# Patient Record
Sex: MALE | Race: WHITE | NOT HISPANIC OR LATINO
[De-identification: names, ages, dates, MRNs, and addresses within clinical notes are randomized per-mention and may not be internally consistent; named-entity substitution may affect disease eponyms.]

---

## 2020-12-25 ENCOUNTER — NON-APPOINTMENT (OUTPATIENT)
Age: 35
End: 2020-12-25

## 2020-12-25 DIAGNOSIS — M50.90 CERVICAL DISC DISORDER, UNSPECIFIED, UNSPECIFIED CERVICAL REGION: ICD-10-CM

## 2020-12-25 DIAGNOSIS — Z87.09 PERSONAL HISTORY OF OTHER DISEASES OF THE RESPIRATORY SYSTEM: ICD-10-CM

## 2020-12-25 DIAGNOSIS — Z86.59 PERSONAL HISTORY OF OTHER MENTAL AND BEHAVIORAL DISORDERS: ICD-10-CM

## 2020-12-25 DIAGNOSIS — Z83.3 FAMILY HISTORY OF DIABETES MELLITUS: ICD-10-CM

## 2020-12-25 DIAGNOSIS — Z78.9 OTHER SPECIFIED HEALTH STATUS: ICD-10-CM

## 2020-12-29 ENCOUNTER — APPOINTMENT (OUTPATIENT)
Dept: GASTROENTEROLOGY | Facility: CLINIC | Age: 35
End: 2020-12-29
Payer: COMMERCIAL

## 2020-12-29 VITALS
HEART RATE: 80 BPM | WEIGHT: 172 LBS | DIASTOLIC BLOOD PRESSURE: 80 MMHG | HEIGHT: 69 IN | BODY MASS INDEX: 25.48 KG/M2 | SYSTOLIC BLOOD PRESSURE: 122 MMHG | TEMPERATURE: 98.7 F

## 2020-12-29 DIAGNOSIS — K64.4 RESIDUAL HEMORRHOIDAL SKIN TAGS: ICD-10-CM

## 2020-12-29 PROCEDURE — 99214 OFFICE O/P EST MOD 30 MIN: CPT

## 2020-12-29 PROCEDURE — 99072 ADDL SUPL MATRL&STAF TM PHE: CPT

## 2020-12-29 NOTE — ASSESSMENT
[FreeTextEntry1] : \par \par \par \par 1. Hemorrhoids:  better, s/p recent flare of externnal w  pain,  swelling,  & bleeding\par most likely 2/2 to preceeding acute diarrheal illness\par * Discussed the potential complications of thrombosis,  pain,  infection,  swelling, itching,  bleeding \par * High – Fiber Diet was reviewed and emphasized\par * 6  --  8 cups of decaffeinated fluid daily\par * Sitz Bathes BID,  No:  Anusol HC  Suppos / Cream  RI BID\par * No:  Tucks BID,   No:  Balneol Lotion,   No:  Calmoseptine Oint,   ++ Prep H prn\par *  Colorectal surgical evaluation for possible ablation w Dr Mishra \par \par \par \par 2. Irregular Bowel habits: better\par    Recent diarrhea followed bu constipation/ bloat\par    H/O Lactose intolerance, Anxiety / Depression\par    +FH metastatic colon Ca at age 61\par    Probably infectious cause\par    R/O IBD\par           Colonic Neoplasia\par           Functional Bowel D/O vs Essential Constipation\par \par P: check cbc, esr, crp, cmet, tsh, B12, FA, iron/celiac/IBD panels\par * Diet: moderate Fiber,  Low Fat & Lactose free, Low FODMAPs\par   currently 6-12 gram/qd\par * Increase fluid intake\par * Regular exercise\par * Probiotics  1  daily\par for coolonoscopy w + FH Colon Ca \par \par                    \par \par                                                                                                                                                                      \par 3. Intermittent dysphagia\par     see GERD\par \par \par \par \par 4. GERD:  active w  ht burn,  dysphagia,  glubus  w/o throat clear\par    R/O Erosive dis, Ring/Stricture, dysmotility\par \par *  h/o  LPR,  R/O  West’s w No Dysplasia  &  Esophagitis grade: A\par * Anti-reflux diet & life-style changes emphasized.  No  Bedge\par * Weight reduction & regular exercise emphasized\par * No  PPI: Omep 40mg q am   ,  ++  H2B q HS: Pepcid AC  \par No Carafate  1 gram:\par * ++   F/U  EGD:  [   1   ] mo.  /  [ 13757u     ] yr\par * No  pH Monitor,  No  Manometry,  f\par for  Esophagram--crude measure of motility\par * +++  ENT  eval/F/U,  No  Surgical  eval\par \par \par \par Informed Consent:\par * The risks & Benefits of  EGD / Colonoscopy were discussed w patient.\par * This included but was not limited to perforation, bleeding, sedation /med rxns possibly requiring surgery, blood transfusions, antibiotics & CPR/Intubation.\par * Pt. understands & agrees to the procedures.\par * Pt. advised to D/C  ASA/NSAIDs  7  Days \par * [ +++ ]  Dulcolax / Miralax / Mag. Citrate ,  [     ] Prepopik/ Clenpiq ,  [     ] Osmo Prep,  [    ] GoLytely,  prep. reviewed w Pt.\par * Hold  [           ] AM of procedure.\par * Hold  [           ] PM of procedure.\par * Take  [           ] PM of procedure.\par * Take  [           ] AM of procedure.\par \par

## 2020-12-29 NOTE — REVIEW OF SYSTEMS
[Dysuria] : no dysuria [Skin Lesions] : no skin lesions [Confused] : no confusion [Suicidal] : not suicidal [Proptosis] : no proptosis [Easy Bruising] : no tendency for easy bruising

## 2020-12-29 NOTE — HISTORY OF PRESENT ILLNESS
[de-identified] : \par \par \par This HPI  reflects a summary and review of records found in [facility], [facility] and/or [employer]\par \par PCP: Symone \par \par 36 yo M w h/o Depression/Anxiety, Cervical disc dis, sinus dis\par GERD w LPR\par Hemorrhoids, lactose intolerance: LBMs & bloat\par H/O typical, reg ht burn usu if cheats. eats late, also w intermittent dysphagia to solids\par also w globus, hoarseness\par has seen several ENTs over the yrs and told of LPR--usu reponds to diet, life-style modification and acid suppression, has normalized if consistent but then returns if non-compliant\par Recently completed Phd: so Incr stress and diet was off\par BMs: recently BMs :# 2-3, some strain/bloat\par intermittent BRBPR--paper                                                                                                                                                                                                                                                                                   Today:~ 2 weeks ago  in the ED w rectal pain x 1 D, told of " inflammed external hemorrhoids--rx w sitz bathes and Anusol HC cream\par prior had 4-5 days of N/V/D, mild cramping and bloat, no fever, chills or BRBPR\par has had intermittent bleeding in the past\par \par * Abd pain—no\par * Nausea—no\par * Vomit—no\par * Belching—no\par * Regurgitation—no\par * Ht burn—yes\par * Throat Clearing—no\par * Globus—yes\par * Cough—occas                       \par * Hoarseness—yes\par * Dysphagia—intermo \par * BMs: # 2-3 qd\par * Constipation—no\par * Diarrhea—no\par * Bloating—yes\par * Flatulence—yes\par * Gurgling—no\par * Melena—no\par * BPBPR—no\par * Anorexia—no\par * Wt. Loss-no\par \par                                                                                                                                                                                                                                                                                                                                                                                                                                                                                                                                                                                                                                                                                                                                                                                                                                                                                                              \par

## 2023-04-19 ENCOUNTER — APPOINTMENT (OUTPATIENT)
Dept: COLORECTAL SURGERY | Facility: CLINIC | Age: 38
End: 2023-04-19
Payer: COMMERCIAL

## 2023-04-19 ENCOUNTER — NON-APPOINTMENT (OUTPATIENT)
Age: 38
End: 2023-04-19

## 2023-04-19 VITALS
HEART RATE: 68 BPM | BODY MASS INDEX: 27.4 KG/M2 | SYSTOLIC BLOOD PRESSURE: 137 MMHG | HEIGHT: 69 IN | DIASTOLIC BLOOD PRESSURE: 87 MMHG | WEIGHT: 185 LBS

## 2023-04-19 PROCEDURE — 46600 DIAGNOSTIC ANOSCOPY SPX: CPT

## 2023-04-19 PROCEDURE — 99203 OFFICE O/P NEW LOW 30 MIN: CPT | Mod: 25

## 2023-04-19 NOTE — HISTORY OF PRESENT ILLNESS
[FreeTextEntry1] : 37 year old male pt referred by Dr. Suarez  for hemorrhoids\par \par Pt's father dies at 67 from colon cancer.\par \par Never had a colonoscopy \par \par Has been complaining about hemorrhoids for years \par A couple of years ago had to go to the ER at [facility] because the pain was so bad  and was unable to urinate due to swelling. Was given a medicine he can't remember to help him urinate. Was advised then to have surgery\par \par Hasn't had that problem with urinating since\par Feels lumps on the outside. Unable to push them back in \par Occasional  bleeding\par Feels pressure and discomfort after a BM, feels tired after a BM usually in the morning\par BM is the only thing that makes it worse. Pain is just when passing stool but then goes away within 30 minutes \par Tried witch hazel, Prep H, has done sitz baths with minimal relief\par Does not take stool softeners or fiber\par Does not get that much fiber in his diet\par Stools are soft\par Interested in having a surgical procedure as he has tried conservative measures with no relief.

## 2023-04-19 NOTE — ASSESSMENT
[FreeTextEntry1] : 37 M referred to us for symptomatic hemorrhoids. He is keen to undergo surgery.\par Plan:\par Discussed surgery: anoscopy, excision hemorrhoidectomy, excision of anal skin tags. Also discussed possible complications ans answered all his questions.\par High fiber diet.\par Plenty of oral fluids.\par To discuss with Dr Suarez - screening colonoscopy.\par

## 2023-04-19 NOTE — PHYSICAL EXAM
[Abdomen Masses] : No abdominal masses [Abdomen Tenderness] : ~T No ~M abdominal tenderness [No HSM] : no hepatosplenomegaly [No Rash or Lesion] : No rash or lesion [Alert] : alert [Oriented to Person] : oriented to person [Oriented to Place] : oriented to place [Oriented to Time] : oriented to time [Calm] : calm [de-identified] : soft [de-identified] : External - multiple anal skin tags; JOCELYN normal; anoscopy - grade 3 internal hemorrhoids [de-identified] : Normal [de-identified] : Normal [de-identified] : Normal [de-identified] : Normal [de-identified] : Normal

## 2023-04-26 ENCOUNTER — NON-APPOINTMENT (OUTPATIENT)
Age: 38
End: 2023-04-26

## 2023-05-01 ENCOUNTER — NON-APPOINTMENT (OUTPATIENT)
Age: 38
End: 2023-05-01

## 2023-05-02 ENCOUNTER — LABORATORY RESULT (OUTPATIENT)
Age: 38
End: 2023-05-02

## 2023-05-02 ENCOUNTER — NON-APPOINTMENT (OUTPATIENT)
Age: 38
End: 2023-05-02

## 2023-05-02 ENCOUNTER — APPOINTMENT (OUTPATIENT)
Dept: GASTROENTEROLOGY | Facility: CLINIC | Age: 38
End: 2023-05-02
Payer: COMMERCIAL

## 2023-05-02 VITALS
HEIGHT: 69 IN | DIASTOLIC BLOOD PRESSURE: 74 MMHG | SYSTOLIC BLOOD PRESSURE: 132 MMHG | WEIGHT: 182 LBS | BODY MASS INDEX: 26.96 KG/M2 | HEART RATE: 65 BPM

## 2023-05-02 DIAGNOSIS — Z00.00 ENCOUNTER FOR GENERAL ADULT MEDICAL EXAMINATION W/OUT ABNORMAL FINDINGS: ICD-10-CM

## 2023-05-02 DIAGNOSIS — K64.8 OTHER HEMORRHOIDS: ICD-10-CM

## 2023-05-02 DIAGNOSIS — Z86.39 PERSONAL HISTORY OF OTHER ENDOCRINE, NUTRITIONAL AND METABOLIC DISEASE: ICD-10-CM

## 2023-05-02 DIAGNOSIS — R13.19 OTHER DYSPHAGIA: ICD-10-CM

## 2023-05-02 DIAGNOSIS — Z86.69 PERSONAL HISTORY OF OTHER DISEASES OF THE NERVOUS SYSTEM AND SENSE ORGANS: ICD-10-CM

## 2023-05-02 DIAGNOSIS — K62.5 HEMORRHAGE OF ANUS AND RECTUM: ICD-10-CM

## 2023-05-02 DIAGNOSIS — R10.31 RIGHT LOWER QUADRANT PAIN: ICD-10-CM

## 2023-05-02 DIAGNOSIS — Z12.11 ENCOUNTER FOR SCREENING FOR MALIGNANT NEOPLASM OF COLON: ICD-10-CM

## 2023-05-02 DIAGNOSIS — Z87.09 PERSONAL HISTORY OF OTHER DISEASES OF THE RESPIRATORY SYSTEM: ICD-10-CM

## 2023-05-02 DIAGNOSIS — R19.8 OTHER SPECIFIED SYMPTOMS AND SIGNS INVOLVING THE DIGESTIVE SYSTEM AND ABDOMEN: ICD-10-CM

## 2023-05-02 PROCEDURE — 99215 OFFICE O/P EST HI 40 MIN: CPT

## 2023-05-02 NOTE — ASSESSMENT
[FreeTextEntry1] : \par \par 1. Recently ED Visit for RLQ pain\par described as cramping/burning \par Labs--> wnl\par Ct Abd/Pelv--> normal appendix / No active inflammation\par \par R/O Infectious ileitis\par        IBD\par        IBS-  H/O Lactose intolerance, Anxiety / Depression\par        Neoplasia--> +FH metastatic colon Ca at age 61\par \par \par P: check cbc, esr, crp, cmet, tsh, B12, FA, iron/celiac/IBD panels\par * Diet: low  Fiber,  Low Fat & Lactose free, Low FODMAPs\par   currently 6-12 gram/qd\par * Increase fluid intake\par * Regular exercise\par * Probiotics  1  daily\par Trial od Peppermint until CT report reviewd--pt to have report faxed \par for colonoscopy \par may need CTE/MRE \par \par \par \par \par 2.Hemorrhoids:  s/p  recent flare of external/ internal w  pain,  swelling,  & bleeding\par * Discussed previously the  potential complications of thrombosis,  pain,  infection,  swelling, itching,  bleeding --none recently\par Recommendations: \par *  low / mod Fiber Diet was reviewed and emphasized\par * 6  --  8 cups of decaffeinated fluid daily was emphasized \par * Sitz Bathes as needed ,  No:  Anusol HC  Suppos / Cream  MI BID -- was needed\par * No:  Tucks BID,  Balneol Lotion,   Calmoseptine Oint -- was needed ;    can use  Prep H prn\par *  had   Colorectal surgical evaluation w Dr. Lynn for possible ablation \par \par \par \par                  \par \par \par                                                                                                                                                                      \par 3. GERD:  presently w no   ht burn,  dysphagia,  globus  w/o throat clear\par    \par *  + h/o  LPR, No h/o West’s or h/o  Esophagitis grade: A--  was found \par \par Recommend: \par * Anti-reflux diet & life-style changes reviewed & re-emphasized.  \par * HOB elevation /  Bedge use emphasized\par * Weight reduction & regular exercise emphasized\par \par * ++ PPI use :        --  as needed was emphasized\par No need for  H2B q HS:  \par No need for Carafate  1 gram \par * No  need for pH Monitor,  Manometry, or  Esophagram \par *  ++  need for ENT  eval/F/U, \par * No  need for  Surgical  eval  \par \par * for  F/U  EGD: --for Barretts screening / surveillance needed \par \par \par \par \par \par \par 4. Colorectal   Neoplasia  Screening:  to be evaluated as part RLQ pain \par   Utilizing teaching posters and anatomical models the following were discussed and emphasized with the patient in detail: \par * Discussed the pre-malignant potential of polyps\par * Discussed the importance of f/u surveillance / screening colonoscopy \par * moderate- Fiber Diet was reviewed and emphasized\par * Anti-oxidants and ASA/NSAID Therapy emphasized\par * Given  + FH of metastatic Colon Ca at \par * Recommend Colonoscopy  to  R/O  Colonic Neoplasia-- in 2023\par \par \par \par \par \par \par Informed Consent:\par * The risks & Benefits of  Colonoscopy were discussed w patient.\par * This included but was not limited to perforation, bleeding, sedation /med rxns possibly requiring surgery, blood transfusions, antibiotics & CPR/Intubation.\par * Pt. understands & agrees to the procedures.\par * Pt. advised to D/C  ASA/NSAIDs  7  Days \par * [ +++ ]  Dulcolax / Miralax / Mag. Citrate ,  [     ] Prepopik/ Clenpiq ,  [     ] Osmo Prep,  [    ] GoLytely,  prep. reviewed w Pt.\par * Hold  [           ] AM of procedure.\par * Hold  [           ] PM of procedure.\par * Take  [           ] PM of procedure.\par * Take  [           ] AM of procedure.\par \par

## 2023-05-02 NOTE — HISTORY OF PRESENT ILLNESS
[de-identified] : \par This HPI reflects a summary and review of records : including previous and most recent  Labs, body imaging, consults and progress notes, operative and pathology reports, EKG reports, ED records, found in [employer], [employer],  [employer] and any additional records brought in by  the patient at the time of the visit.\par \par \par \par PCP: Melissa \par \par 38 yo M w h/o Asthma, HLD, Vit D defic, Sciatica, Depression/Anxiety, Cervical disc dis, sinus dis\par GERD w LPR\par Hemorrhoids, lactose intolerance: LBMs & bloat\par H/O typical, reg ht burn usu if cheats. eats late, also w intermittent dysphagia to solids\par also w globus, hoarseness\par has seen several ENTs over the yrs and told of LPR--usu reponds to diet, life-style modification and acid suppression, has normalized if consistent but then returns if non-compliant\par Recently completed Phd: so Incr stress and diet was off\par BMs: recently BMs :# 2-3, some strain/bloat\par intermittent BRBPR--paper                                                                                                                    \par 12/29/20 Init CC: ~ 2 weeks ago  in the ED w rectal pain x 1 D, told of " inflammed external hemorrhoids--rx w sitz bathes and Anusol HC cream\par prior had 4-5 days of N/V/D, mild cramping and bloat, no fever, chills or BRBPR\par has had intermittent bleeding in the past\par JOCELYN: + ext skin tags at 1, 3, 7, & 10 oclock\par Rec: colorectal eval for ablation, colonoscopy/EGD\par \par 4/19/23 Dr. Lynn colorectal sx: c/o penny-anal lumps and anal pain on defecation\par JOCELYN--> anal skin tags and 3' int hemorrhoid\par Discussed treatment options and need for colonoscopy w + FH Colon Ca \par \par \par 4/30/23 Presented to [facility] ED c/o RLQ pain, 4 days prior went to UC for dysuria given Bactrim\par              Pt states pain was burning/thrombing, hemorrhoids also swollen\par              Pt denied any N/V, fever, melena, brbpr\par             States his stools were # 5,1x/D, pt stated his started after eating 2 cheese burgers at             party.   I n ER Labs: cbc, cmet and lipase--> wnl\par             Ct Abd/Pelvis--> No active inflammation, normal appendix, ? adhesions/tethering, no sbo\par 5/2/23    Today:  Feeling well, no c/o , CP, SOB/ BLOUNT, Cough, Wheeze, Palpitations, edema\par * Regurgitation--> \par * Ht burn—>\par * Throat Clearing—>\par * Globus--> \par * Cough—>                 \par * Hoarseness—\par * Dysphagia—intermittent--> \par * Abd pain—no\par * Nausea—no\par * Vomit—no\par * Belching—no\par * BMs: # \par * Constipation—no\par * Diarrhea—no\par * Bloating—>\par * Flatulence—> \par * Gurgling—no\par * Melena—no\par * BPBPR—no\par * Anorexia—no\par * Wt. Loss-no\par \par                                                                                                                                                                                                                                                                                                                                                                                                                                                                                                                                                                                                                                                                                                                                                                                                                                                                                                              \par

## 2023-05-02 NOTE — PHYSICAL EXAM
[Occult Blood Positive] : stool was negative for occult blood [FreeTextEntry1] : + ext skin tags at 1, 3, 7, & 10 oclock

## 2023-05-03 LAB
ALBUMIN SERPL ELPH-MCNC: 4.8 G/DL
ALP BLD-CCNC: 55 U/L
ALT SERPL-CCNC: 14 U/L
ANION GAP SERPL CALC-SCNC: 14 MMOL/L
AST SERPL-CCNC: 22 U/L
BASOPHILS # BLD AUTO: 0.06 K/UL
BASOPHILS NFR BLD AUTO: 1.4 %
BILIRUB SERPL-MCNC: 0.4 MG/DL
BUN SERPL-MCNC: 9 MG/DL
CALCIUM SERPL-MCNC: 9.4 MG/DL
CHLORIDE SERPL-SCNC: 102 MMOL/L
CO2 SERPL-SCNC: 22 MMOL/L
CREAT SERPL-MCNC: 1.06 MG/DL
CRP SERPL-MCNC: <3 MG/L
EGFR: 92 ML/MIN/1.73M2
EOSINOPHIL # BLD AUTO: 0.07 K/UL
EOSINOPHIL NFR BLD AUTO: 1.7 %
FERRITIN SERPL-MCNC: 272 NG/ML
FOLATE SERPL-MCNC: >20 NG/ML
GLUCOSE SERPL-MCNC: 98 MG/DL
HCT VFR BLD CALC: 43.6 %
HGB BLD-MCNC: 15 G/DL
IMM GRANULOCYTES NFR BLD AUTO: 0.2 %
IRON SATN MFR SERPL: 23 %
IRON SERPL-MCNC: 72 UG/DL
LPL SERPL-CCNC: 18 U/L
LYMPHOCYTES # BLD AUTO: 1.09 K/UL
LYMPHOCYTES NFR BLD AUTO: 26 %
MAN DIFF?: NORMAL
MCHC RBC-ENTMCNC: 29.1 PG
MCHC RBC-ENTMCNC: 34.4 GM/DL
MCV RBC AUTO: 84.7 FL
MONOCYTES # BLD AUTO: 0.48 K/UL
MONOCYTES NFR BLD AUTO: 11.4 %
NEUTROPHILS # BLD AUTO: 2.49 K/UL
NEUTROPHILS NFR BLD AUTO: 59.3 %
PLATELET # BLD AUTO: 280 K/UL
POTASSIUM SERPL-SCNC: 4.3 MMOL/L
PROT SERPL-MCNC: 7.6 G/DL
RBC # BLD: 5.15 M/UL
RBC # FLD: 12.4 %
SODIUM SERPL-SCNC: 137 MMOL/L
TIBC SERPL-MCNC: 309 UG/DL
TSH SERPL-ACNC: 0.99 UIU/ML
UIBC SERPL-MCNC: 236 UG/DL
VIT B12 SERPL-MCNC: 359 PG/ML
WBC # FLD AUTO: 4.2 K/UL

## 2023-05-04 LAB
BAKER'S YEAST AB QL: 20 UNITS
BAKER'S YEAST IGA QL IA: 6.7 UNITS
BAKER'S YEAST IGA QN IA: NEGATIVE
BAKER'S YEAST IGG QN IA: NEGATIVE
BARLEY IGE QN: <0.1 KUA/L
CHERRY IGE QN: <0.1 KUA/L
COW MILK IGE QN: <0.1 KUA/L
CRAB IGE QN: 0.17 KUA/L
DEPRECATED BARLEY IGE RAST QL: 0
DEPRECATED CHERRY IGE RAST QL: 0
DEPRECATED COW MILK IGE RAST QL: 0
DEPRECATED CRAB IGE RAST QL: NORMAL
DEPRECATED EGG WHITE IGE RAST QL: 0
DEPRECATED OAT IGE RAST QL: NORMAL
DEPRECATED PEANUT IGE RAST QL: 0
DEPRECATED RYE IGE RAST QL: 0
DEPRECATED SOYBEAN IGE RAST QL: 0
DEPRECATED WHEAT IGE RAST QL: 0
EGG WHITE IGE QN: <0.1 KUA/L
GLIADIN IGA SER QL: <5 UNITS
GLIADIN IGG SER QL: <5 UNITS
GLIADIN PEPTIDE IGA SER-ACNC: NEGATIVE
GLIADIN PEPTIDE IGG SER-ACNC: NEGATIVE
OAT IGE QN: 0.11 KUA/L
PEANUT IGE QN: <0.1 KUA/L
RYE IGE QN: <0.1 KUA/L
SOYBEAN IGE QN: <0.1 KUA/L
TOTAL IGE SMQN RAST: 233 KU/L
TTG IGA SER IA-ACNC: <1.2 U/ML
TTG IGA SER-ACNC: NEGATIVE
TTG IGG SER IA-ACNC: 3.5 U/ML
TTG IGG SER IA-ACNC: NEGATIVE
WHEAT IGE QN: <0.1 KUA/L

## 2023-05-05 ENCOUNTER — APPOINTMENT (OUTPATIENT)
Dept: GASTROENTEROLOGY | Facility: HOSPITAL | Age: 38
End: 2023-05-05

## 2023-05-05 LAB — GASTRIN SERPL-MCNC: <10 PG/ML

## 2023-05-15 ENCOUNTER — OFFICE (OUTPATIENT)
Dept: URBAN - METROPOLITAN AREA CLINIC 122 | Facility: CLINIC | Age: 38
Setting detail: OPHTHALMOLOGY
End: 2023-05-15
Payer: COMMERCIAL

## 2023-05-15 DIAGNOSIS — G43.109: ICD-10-CM

## 2023-05-15 DIAGNOSIS — H52.7: ICD-10-CM

## 2023-05-15 DIAGNOSIS — H43.812: ICD-10-CM

## 2023-05-15 DIAGNOSIS — H25.012: ICD-10-CM

## 2023-05-15 DIAGNOSIS — H47.092: ICD-10-CM

## 2023-05-15 DIAGNOSIS — H52.13: ICD-10-CM

## 2023-05-15 PROCEDURE — 92015 DETERMINE REFRACTIVE STATE: CPT | Performed by: OPHTHALMOLOGY

## 2023-05-15 PROCEDURE — 92004 COMPRE OPH EXAM NEW PT 1/>: CPT | Performed by: OPHTHALMOLOGY

## 2023-05-15 PROCEDURE — 92250 FUNDUS PHOTOGRAPHY W/I&R: CPT | Performed by: OPHTHALMOLOGY

## 2023-05-15 ASSESSMENT — SPHEQUIV_DERIVED
OD_SPHEQUIV: -1.375
OS_SPHEQUIV: -1.5
OD_SPHEQUIV: -1.375
OS_SPHEQUIV: -1

## 2023-05-15 ASSESSMENT — VISUAL ACUITY
OS_BCVA: 20/20
OD_BCVA: 20/20

## 2023-05-15 ASSESSMENT — REFRACTION_CURRENTRX
OD_OVR_VA: 20/
OS_CYLINDER: -0.75
OD_CYLINDER: -0.50
OD_SPHERE: -1.50
OS_SPHERE: -1.25
OS_OVR_VA: 20/
OS_AXIS: 3
OD_AXIS: 2

## 2023-05-15 ASSESSMENT — TONOMETRY
OS_IOP_MMHG: 19
OD_IOP_MMHG: 19

## 2023-05-15 ASSESSMENT — REFRACTION_MANIFEST
OD_SPHERE: -1.25
OS_SPHERE: -1.25
OS_AXIS: 175
OD_CYLINDER: -0.25
OS_CYLINDER: -0.50
OS_VA1: 20/20
OD_AXIS: 10
OD_VA1: 20/20

## 2023-05-15 ASSESSMENT — REFRACTION_AUTOREFRACTION
OS_SPHERE: -0.75
OD_CYLINDER: -0.25
OD_AXIS: 4
OS_AXIS: 1
OD_SPHERE: -1.25
OS_CYLINDER: -0.50

## 2023-05-15 ASSESSMENT — CONFRONTATIONAL VISUAL FIELD TEST (CVF)
OD_FINDINGS: FULL
OS_FINDINGS: FULL

## 2023-05-18 ENCOUNTER — NON-APPOINTMENT (OUTPATIENT)
Age: 38
End: 2023-05-18

## 2023-05-18 ENCOUNTER — RESULT REVIEW (OUTPATIENT)
Age: 38
End: 2023-05-18

## 2023-05-19 ENCOUNTER — TRANSCRIPTION ENCOUNTER (OUTPATIENT)
Age: 38
End: 2023-05-19

## 2023-05-19 ENCOUNTER — APPOINTMENT (OUTPATIENT)
Dept: GASTROENTEROLOGY | Facility: HOSPITAL | Age: 38
End: 2023-05-19

## 2023-05-23 ENCOUNTER — NON-APPOINTMENT (OUTPATIENT)
Age: 38
End: 2023-05-23

## 2023-05-31 ENCOUNTER — NON-APPOINTMENT (OUTPATIENT)
Age: 38
End: 2023-05-31

## 2023-07-23 ENCOUNTER — RESULT REVIEW (OUTPATIENT)
Age: 38
End: 2023-07-23

## 2023-07-24 ENCOUNTER — APPOINTMENT (OUTPATIENT)
Dept: COLORECTAL SURGERY | Facility: HOSPITAL | Age: 38
End: 2023-07-24
Payer: COMMERCIAL

## 2023-07-24 ENCOUNTER — NON-APPOINTMENT (OUTPATIENT)
Age: 38
End: 2023-07-24

## 2023-07-24 PROCEDURE — ZZZZZ: CPT

## 2023-07-25 ENCOUNTER — NON-APPOINTMENT (OUTPATIENT)
Age: 38
End: 2023-07-25

## 2023-07-25 DIAGNOSIS — R39.198 OTHER DIFFICULTIES WITH MICTURITION: ICD-10-CM

## 2023-07-26 ENCOUNTER — NON-APPOINTMENT (OUTPATIENT)
Age: 38
End: 2023-07-26

## 2023-08-03 ENCOUNTER — NON-APPOINTMENT (OUTPATIENT)
Age: 38
End: 2023-08-03

## 2023-08-09 ENCOUNTER — APPOINTMENT (OUTPATIENT)
Dept: COLORECTAL SURGERY | Facility: CLINIC | Age: 38
End: 2023-08-09
Payer: COMMERCIAL

## 2023-08-09 VITALS
HEIGHT: 69 IN | DIASTOLIC BLOOD PRESSURE: 84 MMHG | HEART RATE: 81 BPM | SYSTOLIC BLOOD PRESSURE: 122 MMHG | BODY MASS INDEX: 26.36 KG/M2 | WEIGHT: 178 LBS

## 2023-08-09 PROCEDURE — 99024 POSTOP FOLLOW-UP VISIT: CPT

## 2023-08-09 RX ORDER — TAMSULOSIN HYDROCHLORIDE 0.4 MG/1
0.4 CAPSULE ORAL
Qty: 5 | Refills: 0 | Status: COMPLETED | COMMUNITY
Start: 2023-07-25 | End: 2023-08-09

## 2023-08-09 NOTE — ASSESSMENT
[FreeTextEntry1] : 37 M, w/p hemorrhoidectomy, here for post-op visit. He is doing well; on exam, the surgical sites are clean and healing well; there is post-op swelling. Plan: High fiber diet. Stool softeners as needed. Sitz baths as needed. See again after 1 month. All questions answered.

## 2023-08-09 NOTE — HISTORY OF PRESENT ILLNESS
[FreeTextEntry1] : 37-year-old male pt s/p anoscopy , excision of left lateral, right posterior, and right anterior hemorrhoids and excision of anterior and posterior anal skin tags on 7/24/23  Here for post op visit  Pain is usually after he has a BM and that last 3 hours afterwards. Not taking anything for pain, stopped about 10 days ago Has discomfort when sitting. Has discharge yellowish thick. Itchiness at nighttime and that is intense. Bleeding has stopped. Last episode was 2-3 days ago. Doing sitz baths after BM and after work 2-4 times a day

## 2023-08-09 NOTE — PHYSICAL EXAM
[No Rash or Lesion] : No rash or lesion [Alert] : alert [Oriented to Person] : oriented to person [Oriented to Place] : oriented to place [Oriented to Time] : oriented to time [Calm] : calm [de-identified] : Normal [de-identified] : Surgical sites - clean and healing well; post-op swelling present [de-identified] : Normal [de-identified] : Normal [de-identified] : Normal [de-identified] : Normal [de-identified] : Normal

## 2023-12-14 ENCOUNTER — OFFICE (OUTPATIENT)
Dept: URBAN - METROPOLITAN AREA CLINIC 122 | Facility: CLINIC | Age: 38
Setting detail: OPHTHALMOLOGY
End: 2023-12-14
Payer: COMMERCIAL

## 2023-12-14 DIAGNOSIS — G43.109: ICD-10-CM

## 2023-12-14 DIAGNOSIS — H25.012: ICD-10-CM

## 2023-12-14 DIAGNOSIS — H43.812: ICD-10-CM

## 2023-12-14 DIAGNOSIS — H47.092: ICD-10-CM

## 2023-12-14 PROCEDURE — 92012 INTRM OPH EXAM EST PATIENT: CPT | Performed by: OPHTHALMOLOGY

## 2023-12-14 PROCEDURE — 92133 CPTRZD OPH DX IMG PST SGM ON: CPT | Performed by: OPHTHALMOLOGY

## 2023-12-14 PROCEDURE — 92083 EXTENDED VISUAL FIELD XM: CPT | Performed by: OPHTHALMOLOGY

## 2023-12-14 ASSESSMENT — REFRACTION_MANIFEST
OD_VA1: 20/20
OS_VA1: 20/20
OD_SPHERE: -1.25
OS_AXIS: 175
OD_AXIS: 10
OS_CYLINDER: -0.50
OD_CYLINDER: -0.25
OS_SPHERE: -1.25

## 2023-12-14 ASSESSMENT — SPHEQUIV_DERIVED
OS_SPHEQUIV: -1.5
OS_SPHEQUIV: -1
OD_SPHEQUIV: -1.375
OD_SPHEQUIV: -1.375

## 2023-12-14 ASSESSMENT — REFRACTION_AUTOREFRACTION
OS_SPHERE: -0.75
OD_CYLINDER: -0.25
OD_SPHERE: -1.25
OS_CYLINDER: -0.50
OD_AXIS: 4
OS_AXIS: 1

## 2023-12-14 ASSESSMENT — REFRACTION_CURRENTRX
OD_SPHERE: -1.50
OS_SPHERE: -1.25
OD_CYLINDER: -0.50
OD_AXIS: 2
OS_AXIS: 3
OD_OVR_VA: 20/
OS_CYLINDER: -0.75
OS_OVR_VA: 20/

## 2024-09-20 ENCOUNTER — OFFICE (OUTPATIENT)
Dept: URBAN - METROPOLITAN AREA CLINIC 122 | Facility: CLINIC | Age: 39
Setting detail: OPHTHALMOLOGY
End: 2024-09-20
Payer: COMMERCIAL

## 2024-09-20 DIAGNOSIS — H47.092: ICD-10-CM

## 2024-09-20 DIAGNOSIS — H52.13: ICD-10-CM

## 2024-09-20 DIAGNOSIS — H43.812: ICD-10-CM

## 2024-09-20 DIAGNOSIS — H25.012: ICD-10-CM

## 2024-09-20 DIAGNOSIS — G43.109: ICD-10-CM

## 2024-09-20 PROCEDURE — 92015 DETERMINE REFRACTIVE STATE: CPT | Performed by: OPHTHALMOLOGY

## 2024-09-20 PROCEDURE — 92014 COMPRE OPH EXAM EST PT 1/>: CPT | Performed by: OPHTHALMOLOGY

## 2024-09-20 PROCEDURE — 92083 EXTENDED VISUAL FIELD XM: CPT | Performed by: OPHTHALMOLOGY

## 2024-09-20 PROCEDURE — 92250 FUNDUS PHOTOGRAPHY W/I&R: CPT | Performed by: OPHTHALMOLOGY

## 2024-09-20 ASSESSMENT — CONFRONTATIONAL VISUAL FIELD TEST (CVF)
OD_FINDINGS: FULL
OS_FINDINGS: FULL

## 2024-11-02 ENCOUNTER — NON-APPOINTMENT (OUTPATIENT)
Age: 39
End: 2024-11-02

## 2024-11-14 ENCOUNTER — APPOINTMENT (OUTPATIENT)
Dept: GASTROENTEROLOGY | Facility: CLINIC | Age: 39
End: 2024-11-14

## 2024-11-27 ENCOUNTER — RX ONLY (RX ONLY)
Age: 39
End: 2024-11-27

## 2024-11-27 ENCOUNTER — OFFICE (OUTPATIENT)
Dept: URBAN - METROPOLITAN AREA CLINIC 122 | Facility: CLINIC | Age: 39
Setting detail: OPHTHALMOLOGY
End: 2024-11-27
Payer: COMMERCIAL

## 2024-11-27 DIAGNOSIS — H47.092: ICD-10-CM

## 2024-11-27 DIAGNOSIS — H16.223: ICD-10-CM

## 2024-11-27 DIAGNOSIS — H52.4: ICD-10-CM

## 2024-11-27 DIAGNOSIS — H21.563: ICD-10-CM

## 2024-11-27 PROCEDURE — 92015 DETERMINE REFRACTIVE STATE: CPT | Performed by: OPHTHALMOLOGY

## 2024-11-27 PROCEDURE — 92012 INTRM OPH EXAM EST PATIENT: CPT | Performed by: OPHTHALMOLOGY

## 2024-11-27 ASSESSMENT — REFRACTION_MANIFEST
OD_SPHERE: -1.25
OS_AXIS: 175
OD_CYLINDER: -0.25
OS_AXIS: 175
OS_SPHERE: -1.00
OS_CYLINDER: -0.75
OD_VA1: 20/20
OD_CYLINDER: -0.25
OS_AXIS: 5
OS_SPHERE: -1.25
OS_VA1: 20/20
OS_ADD: +0.75
OS_SPHERE: -1.25
OS_CYLINDER: -0.50
OD_AXIS: 10
OS_CYLINDER: -0.75
OD_VA1: 20/20
OD_SPHERE: -1.50
OD_ADD: +0.75
OD_SPHERE: -1.50
OD_VA1: 20/20
OD_AXIS: 180
OS_VA1: 20/20
OS_VA1: 20/20

## 2024-11-27 ASSESSMENT — REFRACTION_CURRENTRX
OD_AXIS: 2
OS_OVR_VA: 20/
OD_SPHERE: -1.50
OS_AXIS: 3
OD_OVR_VA: 20/
OS_SPHERE: -1.25
OD_CYLINDER: -0.50
OS_CYLINDER: -0.75

## 2024-11-27 ASSESSMENT — VISUAL ACUITY
OD_BCVA: 20/20
OS_BCVA: 20/20

## 2024-11-27 ASSESSMENT — REFRACTION_AUTOREFRACTION
OD_SPHERE: -1.25
OD_CYLINDER: -0.25
OS_AXIS: 172
OS_CYLINDER: -1.00
OS_SPHERE: -0.75
OD_AXIS: 4

## 2024-11-27 ASSESSMENT — TONOMETRY
OD_IOP_MMHG: 18
OS_IOP_MMHG: 18

## 2024-11-27 ASSESSMENT — TEAR BREAK UP TIME (TBUT)
OD_TBUT: 2+
OS_TBUT: 2+

## 2025-05-11 ENCOUNTER — NON-APPOINTMENT (OUTPATIENT)
Age: 40
End: 2025-05-11

## 2025-06-02 ENCOUNTER — OFFICE (OUTPATIENT)
Dept: URBAN - METROPOLITAN AREA CLINIC 122 | Facility: CLINIC | Age: 40
Setting detail: OPHTHALMOLOGY
End: 2025-06-02
Payer: COMMERCIAL

## 2025-06-02 DIAGNOSIS — T15.12XA: ICD-10-CM

## 2025-06-02 DIAGNOSIS — H11.32: ICD-10-CM

## 2025-06-02 PROCEDURE — 65205 REMOVE FOREIGN BODY FROM EYE: CPT | Mod: LT | Performed by: OPHTHALMOLOGY

## 2025-06-02 PROCEDURE — 92012 INTRM OPH EXAM EST PATIENT: CPT | Mod: 25 | Performed by: OPHTHALMOLOGY

## 2025-06-02 ASSESSMENT — REFRACTION_CURRENTRX
OD_CYLINDER: -0.50
OS_OVR_VA: 20/
OS_CYLINDER: -0.75
OS_SPHERE: -1.25
OD_OVR_VA: 20/
OD_SPHERE: -1.50
OS_AXIS: 3
OD_AXIS: 2

## 2025-06-02 ASSESSMENT — REFRACTION_MANIFEST
OS_AXIS: 175
OS_VA1: 20/20
OS_SPHERE: -1.25
OS_SPHERE: -1.00
OS_AXIS: 5
OD_SPHERE: -1.25
OD_SPHERE: -1.50
OD_VA1: 20/20
OD_VA1: 20/20
OS_SPHERE: -1.25
OS_CYLINDER: -0.75
OD_AXIS: 180
OD_SPHERE: -1.50
OS_AXIS: 175
OS_CYLINDER: -0.50
OS_ADD: +0.75
OD_AXIS: 10
OS_VA1: 20/20
OD_CYLINDER: -0.25
OD_VA1: 20/20
OS_VA1: 20/20
OD_CYLINDER: -0.25
OS_CYLINDER: -0.75
OD_ADD: +0.75

## 2025-06-02 ASSESSMENT — REFRACTION_AUTOREFRACTION
OS_SPHERE: -0.75
OD_AXIS: 4
OD_CYLINDER: -0.25
OS_CYLINDER: -1.00
OS_AXIS: 172
OD_SPHERE: -1.25

## 2025-06-02 ASSESSMENT — TEAR BREAK UP TIME (TBUT)
OD_TBUT: 2+
OS_TBUT: 2+

## 2025-06-02 ASSESSMENT — VISUAL ACUITY
OD_BCVA: 20/20
OS_BCVA: 20/20

## 2025-06-21 ENCOUNTER — NON-APPOINTMENT (OUTPATIENT)
Age: 40
End: 2025-06-21